# Patient Record
Sex: FEMALE | Race: BLACK OR AFRICAN AMERICAN | NOT HISPANIC OR LATINO | URBAN - METROPOLITAN AREA
[De-identification: names, ages, dates, MRNs, and addresses within clinical notes are randomized per-mention and may not be internally consistent; named-entity substitution may affect disease eponyms.]

---

## 2021-02-09 ENCOUNTER — OUTPATIENT (OUTPATIENT)
Dept: OUTPATIENT SERVICES | Facility: HOSPITAL | Age: 46
LOS: 1 days | End: 2021-02-09

## 2021-02-09 VITALS
SYSTOLIC BLOOD PRESSURE: 120 MMHG | TEMPERATURE: 98 F | HEART RATE: 77 BPM | WEIGHT: 195.11 LBS | RESPIRATION RATE: 14 BRPM | HEIGHT: 69 IN | DIASTOLIC BLOOD PRESSURE: 75 MMHG | OXYGEN SATURATION: 99 %

## 2021-02-09 DIAGNOSIS — K64.9 UNSPECIFIED HEMORRHOIDS: ICD-10-CM

## 2021-02-09 DIAGNOSIS — Z90.89 ACQUIRED ABSENCE OF OTHER ORGANS: Chronic | ICD-10-CM

## 2021-02-09 DIAGNOSIS — Z98.890 OTHER SPECIFIED POSTPROCEDURAL STATES: ICD-10-CM

## 2021-02-09 DIAGNOSIS — Z98.890 OTHER SPECIFIED POSTPROCEDURAL STATES: Chronic | ICD-10-CM

## 2021-02-09 LAB
HCG UR QL: NEGATIVE — SIGNIFICANT CHANGE UP
HCT VFR BLD CALC: 40.7 % — SIGNIFICANT CHANGE UP (ref 34.5–45)
HGB BLD-MCNC: 12.9 G/DL — SIGNIFICANT CHANGE UP (ref 11.5–15.5)
MCHC RBC-ENTMCNC: 29.1 PG — SIGNIFICANT CHANGE UP (ref 27–34)
MCHC RBC-ENTMCNC: 31.7 GM/DL — LOW (ref 32–36)
MCV RBC AUTO: 91.7 FL — SIGNIFICANT CHANGE UP (ref 80–100)
NRBC # BLD: 0 /100 WBCS — SIGNIFICANT CHANGE UP
NRBC # FLD: 0 K/UL — SIGNIFICANT CHANGE UP
PLATELET # BLD AUTO: 361 K/UL — SIGNIFICANT CHANGE UP (ref 150–400)
RBC # BLD: 4.44 M/UL — SIGNIFICANT CHANGE UP (ref 3.8–5.2)
RBC # FLD: 13.5 % — SIGNIFICANT CHANGE UP (ref 10.3–14.5)
WBC # BLD: 6.36 K/UL — SIGNIFICANT CHANGE UP (ref 3.8–10.5)
WBC # FLD AUTO: 6.36 K/UL — SIGNIFICANT CHANGE UP (ref 3.8–10.5)

## 2021-02-09 NOTE — H&P PST ADULT - NSICDXPROBLEM_GEN_ALL_CORE_FT
PROBLEM DIAGNOSES  Problem: Hemorrhoids  Assessment and Plan: Pt scheduled for bleeding hemorrhoid, hemorrhoidectomy on 2/18/2021.  labs done results pending.   Pt scheduled for covid testing.  Urine cup provided.  Preop teaching done, pt able to verbalize understanding.   meds day of surgery pepcid

## 2021-02-09 NOTE — H&P PST ADULT - RS GEN PE MLT RESP DETAILS PC
breath sounds equal/good air movement/respirations non-labored/clear to auscultation bilaterally/no chest wall tenderness/no intercostal retractions/no rales/no rhonchi/no wheezes

## 2021-02-09 NOTE — H&P PST ADULT - NSICDXPASTSURGICALHX_GEN_ALL_CORE_FT
PAST SURGICAL HISTORY:  S/P myomectomy 2017    S/P rotator cuff repair left    S/P T&A (status post tonsillectomy and adenoidectomy) 1987

## 2021-02-09 NOTE — H&P PST ADULT - HISTORY OF PRESENT ILLNESS
46y/o female scheduled for bleeding hemorrhoid, hemorrhoidectomy on 2/18/2021.  Pt states, "hx of hemorrhoids since 1993, over the past 2 years, symptoms have worsened, bleeding with bm."

## 2021-02-13 DIAGNOSIS — Z01.818 ENCOUNTER FOR OTHER PREPROCEDURAL EXAMINATION: ICD-10-CM

## 2021-02-13 PROBLEM — Z00.00 ENCOUNTER FOR PREVENTIVE HEALTH EXAMINATION: Status: ACTIVE | Noted: 2021-02-13

## 2021-02-15 ENCOUNTER — APPOINTMENT (OUTPATIENT)
Dept: DISASTER EMERGENCY | Facility: CLINIC | Age: 46
End: 2021-02-15

## 2021-02-16 LAB — SARS-COV-2 N GENE NPH QL NAA+PROBE: NOT DETECTED

## 2021-02-17 ENCOUNTER — TRANSCRIPTION ENCOUNTER (OUTPATIENT)
Age: 46
End: 2021-02-17

## 2021-02-18 ENCOUNTER — RESULT REVIEW (OUTPATIENT)
Age: 46
End: 2021-02-18

## 2021-02-18 ENCOUNTER — OUTPATIENT (OUTPATIENT)
Dept: OUTPATIENT SERVICES | Facility: HOSPITAL | Age: 46
LOS: 1 days | Discharge: HOME | End: 2021-02-18
Payer: MEDICAID

## 2021-02-18 VITALS
OXYGEN SATURATION: 99 % | HEIGHT: 69 IN | WEIGHT: 195.11 LBS | RESPIRATION RATE: 20 BRPM | SYSTOLIC BLOOD PRESSURE: 110 MMHG | DIASTOLIC BLOOD PRESSURE: 66 MMHG | TEMPERATURE: 98 F | HEART RATE: 71 BPM

## 2021-02-18 VITALS
RESPIRATION RATE: 22 BRPM | HEART RATE: 65 BPM | TEMPERATURE: 98 F | OXYGEN SATURATION: 98 % | SYSTOLIC BLOOD PRESSURE: 126 MMHG | DIASTOLIC BLOOD PRESSURE: 82 MMHG

## 2021-02-18 DIAGNOSIS — Z98.890 OTHER SPECIFIED POSTPROCEDURAL STATES: Chronic | ICD-10-CM

## 2021-02-18 DIAGNOSIS — K64.9 UNSPECIFIED HEMORRHOIDS: ICD-10-CM

## 2021-02-18 DIAGNOSIS — Z90.89 ACQUIRED ABSENCE OF OTHER ORGANS: Chronic | ICD-10-CM

## 2021-02-18 PROCEDURE — 88304 TISSUE EXAM BY PATHOLOGIST: CPT | Mod: 26

## 2021-02-18 RX ORDER — OXYCODONE HYDROCHLORIDE 5 MG/1
1 TABLET ORAL
Qty: 20 | Refills: 0
Start: 2021-02-18

## 2021-02-18 RX ORDER — DOCUSATE SODIUM 100 MG
1 CAPSULE ORAL
Qty: 60 | Refills: 0
Start: 2021-02-18

## 2021-02-18 NOTE — ASU PREOP CHECKLIST - PATIENT PROBLEMS/NEEDS
"Requested Prescriptions   Pending Prescriptions Disp Refills     omega-3 acid ethyl esters (LOVAZA) 1 G capsule 90 capsule 3     Sig: Take 1 capsule (1 g) by mouth daily    Antihyperlipidemic agents Passed    3/30/2018 11:39 AM       Passed - Lipid panel on file in past 12 mos    Recent Labs   Lab Test  06/13/17   0809   05/15/15   0833   CHOL  137   < >  183   TRIG  280*   < >  523*   HDL  36*   < >  35*   LDL  45   < >  Cannot estimate LDL when triglyceride exceeds 400 mg/dL  76   NHDL  101   < >   --    VLDL   --    --   Cannot estimate VLDL when triglyceride exceeds 400 mg/dL.   CHOLHDLRATIO   --    --   5.2*    < > = values in this interval not displayed.              Passed - Normal serum ALT on record in past 12 mos    Recent Labs   Lab Test  06/13/17   0809   ALT  37            Passed - Recent (12 mo) or future (30 days) visit within the authorizing provider's specialty    Patient had office visit in the last 12 months or has a visit in the next 30 days with authorizing provider or within the authorizing provider's specialty.  See \"Patient Info\" tab in inbasket, or \"Choose Columns\" in Meds & Orders section of the refill encounter.           Passed - Patient is age 18 years or older          "
Prescription approved per [facility] Refill Protocol  Yvette Rossi RN BS    
Requested Prescriptions   Pending Prescriptions Disp Refills     omega-3 acid ethyl esters (LOVAZA) 1 G capsule  Last Written Prescription Date:  5/11/2017  Last Fill Quantity: 90,  # refills: 3   Last office visit: 10/6/2017 with prescribing provider:  10/6/2017   Future Office Visit:   90 capsule 3     Sig: Take 1 capsule (1 g) by mouth daily    There is no refill protocol information for this order        
Patient expressed no known problems or needs

## 2021-02-18 NOTE — BRIEF OPERATIVE NOTE - NSICDXBRIEFPROCEDURE_GEN_ALL_CORE_FT
PROCEDURES:  Hemorrhoidectomy involving two or more anal columns 18-Feb-2021 12:13:40  Will Balbuena

## 2021-02-18 NOTE — ASU DISCHARGE PLAN (ADULT/PEDIATRIC) - CALL YOUR DOCTOR IF YOU HAVE ANY OF THE FOLLOWING:
Bleeding that does not stop/Swelling that gets worse/Pain not relieved by Medications/Wound/Surgical Site with redness, or foul smelling discharge or pus/Unable to urinate

## 2021-02-18 NOTE — ASU DISCHARGE PLAN (ADULT/PEDIATRIC) - ASU DC SPECIAL INSTRUCTIONSFT
PAIN CONTROL: You may take 650-1000 mg of Tylenol every 6 hours. Do not exceed 4 grams of Tylenol daily. You may take 400-600 mg ibuprofen every 6 hours. It may be helpful to alternate the two every 3 hours. Take oxycodone as needed for severe pain, one tab every 4-6 hours. Do not exceed 6 tabs daily.  WOUND CARE: Remove the tape, gauze, and packing in your rectum tomorrow morning.   BATHING: No restrictions.  SITZ BATHS: Please perform Sitz baths at least twice daily.  CONSTIPATION: Take colace twice a day as needed to keep your stools soft in order to avoid straining with bowel movements.   ACTIVITY: No heavy lifting or straining. Otherwise, you may return to your usual level of physical activity. If you are taking narcotic pain medication (such as oxycodone), do NOT drive a car, operate machinery or make important decisions.  DIET: Return to your usual diet.  NOTIFY YOUR SURGEON IF: You have any bleeding that does not stop, any pus draining from your wound, any fever (over 100.4 F) or chills, persistent nausea/vomiting, persistent diarrhea, or if your pain is not controlled on your discharge pain medications.  FOLLOW-UP:  1. Please follow up with Dr. Mcnamara within 1-2 weeks after discharge from the hospital. You may call [phone] to schedule an appointment.   2. Please follow up with your primary care physician in one week regarding your hospitalization.

## 2021-02-18 NOTE — ASU DISCHARGE PLAN (ADULT/PEDIATRIC) - CARE PROVIDER_API CALL
Shahzad Mcnamara)  Surgery  [address], [address]  [place] [address]  Phone: [phone]  Fax: [phone]  Follow Up Time:

## 2021-02-25 LAB — SURGICAL PATHOLOGY STUDY: SIGNIFICANT CHANGE UP

## 2021-08-31 NOTE — H&P PST ADULT - BACK EXAM
I personally performed the service described in the documentation recorded by the scribe in my presence, and it accurately and completely records my words and actions. normal shape/ROM intact/strength intact

## 2022-01-18 PROBLEM — K64.9 UNSPECIFIED HEMORRHOIDS: Chronic | Status: ACTIVE | Noted: 2021-02-09

## 2022-02-01 ENCOUNTER — NON-APPOINTMENT (OUTPATIENT)
Age: 47
End: 2022-02-01

## 2022-02-01 ENCOUNTER — APPOINTMENT (OUTPATIENT)
Dept: OPHTHALMOLOGY | Facility: CLINIC | Age: 47
End: 2022-02-01
Payer: MEDICAID

## 2022-02-01 PROCEDURE — 92004 COMPRE OPH EXAM NEW PT 1/>: CPT

## 2023-08-11 ENCOUNTER — OFFICE (OUTPATIENT)
Dept: URBAN - METROPOLITAN AREA CLINIC 77 | Facility: CLINIC | Age: 48
Setting detail: OPHTHALMOLOGY
End: 2023-08-11
Payer: COMMERCIAL

## 2023-08-11 DIAGNOSIS — H16.223: ICD-10-CM

## 2023-08-11 DIAGNOSIS — T26.61XA: ICD-10-CM

## 2023-08-11 PROBLEM — H16.221 DRY EYE SYNDROME K SICCA; RIGHT EYE, LEFT EYE: Status: ACTIVE | Noted: 2023-08-11

## 2023-08-11 PROBLEM — T26.62XA: Status: ACTIVE | Noted: 2023-08-11

## 2023-08-11 PROBLEM — H16.222 DRY EYE SYNDROME K SICCA; RIGHT EYE, LEFT EYE: Status: ACTIVE | Noted: 2023-08-11

## 2023-08-11 PROCEDURE — 68761 CLOSE TEAR DUCT OPENING: CPT | Performed by: OPHTHALMOLOGY

## 2023-08-11 PROCEDURE — 99204 OFFICE O/P NEW MOD 45 MIN: CPT | Performed by: OPHTHALMOLOGY

## 2023-08-11 ASSESSMENT — TONOMETRY
OS_IOP_MMHG: 11
OD_IOP_MMHG: 11

## 2023-08-11 ASSESSMENT — LID EXAM ASSESSMENTS
OS_COMMENTS: MUCOID DISCHARGE
OS_EDEMA: 1+
OD_COMMENTS: MUCOID DISCHARGE
OD_EDEMA: 1+

## 2023-08-11 ASSESSMENT — VISUAL ACUITY
OD_BCVA: 20/150
OS_BCVA: 20/60

## 2023-09-22 ASSESSMENT — CORNEAL TRAUMA - ABRASION
OD_ABRASION: PRESENT
OS_ABRASION: PRESENT

## 2023-09-22 ASSESSMENT — SUPERFICIAL PUNCTATE KERATITIS (SPK)
OD_SPK: 4+
OS_SPK: 4+

## 2024-04-22 ENCOUNTER — NON-APPOINTMENT (OUTPATIENT)
Age: 49
End: 2024-04-22

## 2024-04-24 ENCOUNTER — APPOINTMENT (OUTPATIENT)
Dept: OTOLARYNGOLOGY | Facility: CLINIC | Age: 49
End: 2024-04-24
Payer: MEDICAID

## 2024-04-24 VITALS
SYSTOLIC BLOOD PRESSURE: 106 MMHG | OXYGEN SATURATION: 95 % | DIASTOLIC BLOOD PRESSURE: 69 MMHG | BODY MASS INDEX: 28.88 KG/M2 | TEMPERATURE: 97.3 F | HEART RATE: 79 BPM | WEIGHT: 195 LBS | HEIGHT: 69 IN

## 2024-04-24 PROCEDURE — 31231 NASAL ENDOSCOPY DX: CPT

## 2024-04-24 PROCEDURE — 99204 OFFICE O/P NEW MOD 45 MIN: CPT | Mod: 25

## 2024-04-24 RX ORDER — AZELASTINE HYDROCHLORIDE 137 UG/1
SPRAY, METERED NASAL
Refills: 0 | Status: ACTIVE | COMMUNITY

## 2024-05-07 ENCOUNTER — APPOINTMENT (OUTPATIENT)
Dept: OTOLARYNGOLOGY | Facility: CLINIC | Age: 49
End: 2024-05-07

## 2024-05-08 ENCOUNTER — APPOINTMENT (OUTPATIENT)
Dept: OTOLARYNGOLOGY | Facility: CLINIC | Age: 49
End: 2024-05-08
Payer: MEDICAID

## 2024-05-08 VITALS
SYSTOLIC BLOOD PRESSURE: 107 MMHG | DIASTOLIC BLOOD PRESSURE: 70 MMHG | BODY MASS INDEX: 28.88 KG/M2 | HEART RATE: 79 BPM | OXYGEN SATURATION: 99 % | WEIGHT: 195 LBS | HEIGHT: 69 IN | TEMPERATURE: 97.1 F

## 2024-05-08 PROCEDURE — 99213 OFFICE O/P EST LOW 20 MIN: CPT

## 2024-05-08 NOTE — HISTORY OF PRESENT ILLNESS
[de-identified] : CC: nasal congestion   HISTORY OF PRESENT ILLNESS: Ms. Portillo is a pleasant 48 year old lady with nasal congestion referred by Dr. Willie Tucker, an Otolaryngologist, due to the complexity of the case patient is a WTC survivor, she was working at [employer] at the time former smoker, did not experience significant issues until after 9/11, with some SOB and nasal congestion symptoms were more manageable until the past year she has had significant nasal congestion, headaches, anosmia, and orbital discomfort she was recently on prednisone and antibiotics and they were not effective in alleviating her symptoms she has tried astelin/flonase and saline rinses CT sinus performed at [facility] shows significant sinusitis right worse than left, slight DNS, supraorbital ethmoid cells, dehiscent anterior ethmoidal artery, right suprabullar cell   Environmental Allergies: negative Immunotherapy:no Smoker: quit Asthma: no ASA sensitivity: no History of Autoimmune Disease: No History of Immune Deficiency: No   Key Elements at least 4 described: Location: bilateral Severity: severe Quality: congestion Timing: constant Duration: year Modifying Factors: none Associated signs and symptoms: see above   preop visit still very symptomatic, having significant gagging   REVIEW OF SYSTEMS: General ROS: negative for - chills, fatigue or fever Psychological ROS: negative for - anxiety or depression Ophthalmic ROS: negative for - blurry vision, decreased vision or double vision ENT ROS: negative except as noted from HPI Allergy and Immunology ROS: negative except as noted from HPI Hematological and Lymphatic ROS: negative for - bleeding problems Endocrine ROS: negative for - malaise/lethargy Respiratory ROS: negative for - stridor Cardiovascular ROS: negative for - chest pain Gastrointestinal ROS: negative for - appetite loss or nausea/vomiting Genitourinary ROS: negative for - incontinence Musculoskeletal ROS: negative for - gait disturbance Neurological ROS: negative for - behavioral changes Dermatological ROS: negative for - nail changes   Physical Exam:   GENERAL APPEARANCE: Well-developed and No Acute Distress. COMMUNICATION: Able to Communicate. Strong Voice.   HEAD AND FACE Eyes: Testing of ocular motility including primary gaze alignment normal. Inspection and Appearance: No evidence of lesions or masses Palpation: Palpation of the face reveals no sinus tenderness Salivary Glands: Symmetric without masses Facial Strength: Symmetric without evidence of facial paralysis   EAR, NOSE, MOUTH, and THROAT: Ear Canals and Tympanic Membranes, Bilateral: No evidence of inflammation or lesions. Thresholds: Clinical speech reception thresholds normal. External, Nose and Auricle: No lesions or masses. Nasal, Mucosa, Septum, and Turbinates: See endoscopy.   NECK: Evaluation: No evidence of masses or crepitus. The neck is symmetric and the trachea is in the midline position. Thyroid: No evidence of enlargement, tenderness or mass. Neck Lymph Nodes: WNL. Respiratory: Inspection of the chest including symmetry, expansion and/or assessment of respiratory effort normal. Cardiovascular: Evaluation of peripheral vascular system by observation and palpation of capillary refill, normal. Neurological/Psychiatric: Alert, Oriented, Mood, and Affect Normal.   IMAGING: see HPI   previous visit PROCEDURE: Nasal endoscopy ([id number])   SURGEON: Claus Kim MD   Prior to the procedure, I had a discussion with the patient regarding the risks, benefits, and alternatives of the procedure and a verbal consent was obtained.   After obtaining adequate decongestion of the nasal mucosa with topical Epinephrine and adequate anesthesia with topical Lidocaine nasal spray, the nasal endoscope was used to examine the nasal passages and paranasal sinuses. The endoscope was passed along the floor of the nose bilaterally to evaluate the inferior meatus, floor of the nose, inferior turbinate, and nasopharynx. The scope was then passed superiorly to evaluate the area of the sphenoethmoidal recess, superior turbinate and superior meatus. As the scope was withdrawn anteriorly, the middle turbinate and middle meatus were carefully inspected. The endoscope was withdrawn and the patient tolerated the procedure well. No complications were encountered.   INSTRUMENTS: rigid 45   EXAM FINDINGS: severe BITH and clear mucus. after decongestion with pledgets, copious clear mucus suctioned out from the nasopharynx to provide some symptomatic relief. diffuse edema and erythema. no clayton polyps or mucopurulence   IMPRESSION: Ms. Portillo is a pleasant 48 year old lady with DNS, CRSsNP, WTC survivor   PLAN: -r/b/a discussed, we will proceed with bFESS septoplasty. she understands she has complex sinus anatomy -will need periop meds  Review of surgical indications for Endoscopic Sinus Surgery:  This patient has had a history of chronic sinusitis of greater than 1 year duration including treatment time, and this treatment has included multiple courses of antibiotics and steroid nasal sprays. Despite these attempts at treatment, the patient continues to complain of nasal congestion, discolored nasal drainage, and a disturbance of smell and taste function. The patient has undergone CT scanning that has findings that are consistent with chronic sinusitis.  Therefore, based on the symptoms of chronic sinusitis,objective findings of CT scan consistent with chronic sinusitis, the time course of symptoms and failure of medical treatment, this patient meets the indications for surgical intervention and requres endoscopic sinus surgery in attempt to relieve symptoms.  The need for maintenance with daily nasal irrigations with saline/topical steroids, and antihistamine to minimize the chronic inflammation was discussed with the patient.   The risks of endoscopic sinus surgery were also discussed in detail with the patient. More specifically, the risks of CSF leak, orbital injury with temporary or permanent vision loss, nasal bleeding, epiphora, loss of smell, and the potential for persistence of their underlying problem.     Claus Kim MD [facility] Rhinology and [facility] Surgery Department of Otolaryngology- Head and Neck Surgery [facility]

## 2024-05-09 ENCOUNTER — TRANSCRIPTION ENCOUNTER (OUTPATIENT)
Age: 49
End: 2024-05-09

## 2024-05-15 RX ORDER — SULFAMETHOXAZOLE AND TRIMETHOPRIM 800; 160 MG/1; MG/1
800-160 TABLET ORAL TWICE DAILY
Qty: 28 | Refills: 0 | Status: ACTIVE | COMMUNITY
Start: 2024-05-15 | End: 1900-01-01

## 2024-05-15 RX ORDER — ELASTIC BANDAGE 1"X2.2YD
2300-700 BANDAGE TOPICAL
Qty: 1 | Refills: 0 | Status: ACTIVE | COMMUNITY
Start: 2024-05-15 | End: 1900-01-01

## 2024-05-15 RX ORDER — CLINDAMYCIN HYDROCHLORIDE 150 MG/1
150 CAPSULE ORAL
Qty: 42 | Refills: 0 | Status: ACTIVE | COMMUNITY
Start: 2024-05-15 | End: 1900-01-01

## 2024-05-23 ENCOUNTER — APPOINTMENT (OUTPATIENT)
Dept: OTOLARYNGOLOGY | Facility: CLINIC | Age: 49
End: 2024-05-23
Payer: MEDICAID

## 2024-05-23 PROCEDURE — 31259 NSL/SINS NDSC SPHN TISS RMVL: CPT | Mod: 50

## 2024-05-23 PROCEDURE — 30520 REPAIR OF NASAL SEPTUM: CPT

## 2024-05-23 PROCEDURE — 31276 NSL/SINS NDSC FRNT TISS RMVL: CPT | Mod: 50

## 2024-05-23 PROCEDURE — 30930 THER FX NASAL INF TURBINATE: CPT

## 2024-05-23 PROCEDURE — 31267 ENDOSCOPY MAXILLARY SINUS: CPT | Mod: 50

## 2024-05-23 PROCEDURE — 61782 SCAN PROC CRANIAL EXTRA: CPT

## 2024-05-28 ENCOUNTER — APPOINTMENT (OUTPATIENT)
Dept: OTOLARYNGOLOGY | Facility: CLINIC | Age: 49
End: 2024-05-28
Payer: MEDICAID

## 2024-05-28 PROCEDURE — 31237 NSL/SINS NDSC SURG BX POLYPC: CPT | Mod: 50,79

## 2024-05-28 RX ORDER — OXYCODONE AND ACETAMINOPHEN 5; 325 MG/1; MG/1
5-325 TABLET ORAL
Qty: 30 | Refills: 0 | Status: DISCONTINUED | COMMUNITY
Start: 2024-05-15 | End: 2024-05-28

## 2024-05-28 RX ORDER — HYDROMORPHONE HYDROCHLORIDE 2 MG/1
2 TABLET ORAL
Qty: 30 | Refills: 0 | Status: ACTIVE | COMMUNITY
Start: 2024-05-28 | End: 1900-01-01

## 2024-05-28 NOTE — PROCEDURE
[FreeTextEntry3] : HISTORY OF PRESENT ILLNESS Ms. Portillo is a pleasant 48 year old lady who is s/p bFESS septoplasty on 5/23/24. Currently on pred20/clinda/bactrim/NSI. Complaints: congestion, headache   Physical Exam General: AAO x 3, WDWN Ears: Canals clear, TM;s nrml Nose: See endoscopy Throat: uvula midline. No masses or lesions Eyes: PERRL, EOMI Neuro: Gross nrml, CN 2-12 intact Resp: Nrml chest excursion Skin: Appears intact   Procedure Note   PROCEDURE: Nasal/sinus endoscopy, surgical, with debridement ([id number])   INDICATION:   Prior to the procedure, I had a discussion with the patient regarding the risks, benefits, and alternatives of the debridement and they signed a consent.   SURGEON: Dr. Claus Kim   PROCEDURE DETAIL: After obtaining adequate decongestion of the nasal mucosa with ephedrine nasal spray, and adequate anesthesia with 2% topical Lidocaine nasal spray, the nasal endoscope was used to examine the nasal passages and paranasal sinuses. Using a combination of suction, grasping instruments and swabs, the maxillary, ethmoid, frontal and sphenoid sinuses were debrided bilaterally of pus, crust, debris, clots and polyps to prevent scar formation, continued sinusitis and mucocele formation. At the end of this procedure, all operated sinuses were patent. The endoscope was withdrawn, and the patient tolerated the procedure well. No complications were encountered.   INSTRUMENTS: rigid 45   ENDOSCOPIC FINDINGS: doyles and fingercots removed. partially debrided   Impression: doing well   Plan: -Complete course of antibiotics -Continue 20mg of prednisone -Continue nasal irrigations with saline -Return to clinic in 1 week.   The patient was given an opportunity to ask questions, and all questions asked were answered to the best of my ability.   Claus Kim MD

## 2024-05-30 ENCOUNTER — APPOINTMENT (OUTPATIENT)
Dept: OTOLARYNGOLOGY | Facility: CLINIC | Age: 49
End: 2024-05-30

## 2024-06-03 ENCOUNTER — APPOINTMENT (OUTPATIENT)
Dept: OTOLARYNGOLOGY | Facility: CLINIC | Age: 49
End: 2024-06-03
Payer: MEDICAID

## 2024-06-03 PROCEDURE — 31237 NSL/SINS NDSC SURG BX POLYPC: CPT | Mod: 50,79

## 2024-06-03 NOTE — PROCEDURE
[FreeTextEntry3] : HISTORY OF PRESENT ILLNESS Ms. Portillo is a pleasant 48 year old lady who is s/p bFESS septoplasty on 5/23/24. Currently on pred20/clinda/bactrim/NSI. Complaints: feeling much better   Physical Exam General: AAO x 3, WDWN Ears: Canals clear, TM;s nrml Nose: See endoscopy Throat: uvula midline. No masses or lesions Eyes: PERRL, EOMI Neuro: Gross nrml, CN 2-12 intact Resp: Nrml chest excursion Skin: Appears intact   Procedure Note   PROCEDURE: Nasal/sinus endoscopy, surgical, with debridement ([id number])   INDICATION:   Prior to the procedure, I had a discussion with the patient regarding the risks, benefits, and alternatives of the debridement and they signed a consent.   SURGEON: Dr. Claus Kim   PROCEDURE DETAIL: After obtaining adequate decongestion of the nasal mucosa with ephedrine nasal spray, and adequate anesthesia with 2% topical Lidocaine nasal spray, the nasal endoscope was used to examine the nasal passages and paranasal sinuses. Using a combination of suction, grasping instruments and swabs, the maxillary, ethmoid, frontal and sphenoid sinuses were debrided bilaterally of pus, crust, debris, clots and polyps to prevent scar formation, continued sinusitis and mucocele formation. At the end of this procedure, all operated sinuses were patent. The endoscope was withdrawn, and the patient tolerated the procedure well. No complications were encountered.   INSTRUMENTS: rigid 45   ENDOSCOPIC FINDINGS: much improved debris removed, thick mucus from frontal removed. airway widely patent.   Impression: doing well   Plan: -Complete course of antibiotics -Continue 10mg of prednisone for a week, 5mg for a week then off -Continue nasal irrigations with saline -Return to clinic in 2 weeks.   The patient was given an opportunity to ask questions, and all questions asked were answered to the best of my ability.   Claus Kim MD

## 2024-06-19 ENCOUNTER — APPOINTMENT (OUTPATIENT)
Dept: OTOLARYNGOLOGY | Facility: CLINIC | Age: 49
End: 2024-06-19
Payer: MEDICAID

## 2024-06-19 VITALS
OXYGEN SATURATION: 99 % | HEART RATE: 76 BPM | BODY MASS INDEX: 28.88 KG/M2 | DIASTOLIC BLOOD PRESSURE: 79 MMHG | WEIGHT: 195 LBS | TEMPERATURE: 97.9 F | HEIGHT: 69 IN | SYSTOLIC BLOOD PRESSURE: 116 MMHG

## 2024-06-19 PROCEDURE — 31237 NSL/SINS NDSC SURG BX POLYPC: CPT | Mod: 50,79

## 2024-06-19 RX ORDER — BUDESONIDE 0.5 MG/2ML
0.5 INHALANT ORAL
Qty: 180 | Refills: 3 | Status: ACTIVE | COMMUNITY
Start: 2024-06-19 | End: 1900-01-01

## 2024-06-19 NOTE — PROCEDURE
[FreeTextEntry3] : HISTORY OF PRESENT ILLNESS Ms. Portillo is a pleasant 48 year old lady who is s/p bFESS septoplasty on 5/23/24. Currently on pred5/NSI. Complaints: feeling much better but still have some PND and right frontal pain   Physical Exam General: AAO x 3, WDWN Ears: Canals clear, TM;s nrml Nose: See endoscopy Throat: uvula midline. No masses or lesions Eyes: PERRL, EOMI Neuro: Gross nrml, CN 2-12 intact Resp: Nrml chest excursion Skin: Appears intact   Procedure Note   PROCEDURE: Nasal/sinus endoscopy, surgical, with debridement ([id number])   INDICATION:   Prior to the procedure, I had a discussion with the patient regarding the risks, benefits, and alternatives of the debridement and they signed a consent.   SURGEON: Dr. Claus Kim   PROCEDURE DETAIL: After obtaining adequate decongestion of the nasal mucosa with ephedrine nasal spray, and adequate anesthesia with 2% topical Lidocaine nasal spray, the nasal endoscope was used to examine the nasal passages and paranasal sinuses. Using a combination of suction, grasping instruments and swabs, the maxillary, ethmoid, frontal and sphenoid sinuses were debrided bilaterally of pus, crust, debris, clots and polyps to prevent scar formation, continued sinusitis and mucocele formation. At the end of this procedure, all operated sinuses were patent. The endoscope was withdrawn, and the patient tolerated the procedure well. No complications were encountered.   INSTRUMENTS: rigid 45   ENDOSCOPIC FINDINGS: polypoid edema in the frontals right worse than left but frontals are widely patent. right worse than left debris removed. airway widely patent.   Impression: doing well   Plan: -taper off pred -Continue nasal irrigations with budesonide -Return to clinic in 4 weeks.   The patient was given an opportunity to ask questions, and all questions asked were answered to the best of my ability.   Claus Kim MD

## 2024-06-26 ENCOUNTER — APPOINTMENT (OUTPATIENT)
Dept: OTOLARYNGOLOGY | Facility: CLINIC | Age: 49
End: 2024-06-26

## 2024-06-26 VITALS
TEMPERATURE: 97.7 F | DIASTOLIC BLOOD PRESSURE: 77 MMHG | BODY MASS INDEX: 28.88 KG/M2 | HEART RATE: 79 BPM | HEIGHT: 69 IN | OXYGEN SATURATION: 98 % | SYSTOLIC BLOOD PRESSURE: 117 MMHG | WEIGHT: 195 LBS

## 2024-06-26 DIAGNOSIS — J32.1 CHRONIC FRONTAL SINUSITIS: ICD-10-CM

## 2024-06-26 DIAGNOSIS — J32.2 CHRONIC ETHMOIDAL SINUSITIS: ICD-10-CM

## 2024-06-26 DIAGNOSIS — J34.2 DEVIATED NASAL SEPTUM: ICD-10-CM

## 2024-06-26 DIAGNOSIS — J32.0 CHRONIC MAXILLARY SINUSITIS: ICD-10-CM

## 2024-06-26 DIAGNOSIS — J32.3 CHRONIC SPHENOIDAL SINUSITIS: ICD-10-CM

## 2024-06-26 PROCEDURE — 31231 NASAL ENDOSCOPY DX: CPT | Mod: 58

## 2024-06-26 PROCEDURE — 99214 OFFICE O/P EST MOD 30 MIN: CPT | Mod: 25

## 2024-06-27 ENCOUNTER — RX RENEWAL (OUTPATIENT)
Age: 49
End: 2024-06-27

## 2024-07-17 ENCOUNTER — APPOINTMENT (OUTPATIENT)
Dept: OTOLARYNGOLOGY | Facility: CLINIC | Age: 49
End: 2024-07-17
Payer: MEDICAID

## 2024-07-17 VITALS
HEIGHT: 69 IN | TEMPERATURE: 98.2 F | DIASTOLIC BLOOD PRESSURE: 74 MMHG | HEART RATE: 73 BPM | SYSTOLIC BLOOD PRESSURE: 110 MMHG | WEIGHT: 195 LBS | OXYGEN SATURATION: 100 % | BODY MASS INDEX: 28.88 KG/M2

## 2024-07-17 DIAGNOSIS — J34.2 DEVIATED NASAL SEPTUM: ICD-10-CM

## 2024-07-17 DIAGNOSIS — J32.2 CHRONIC ETHMOIDAL SINUSITIS: ICD-10-CM

## 2024-07-17 DIAGNOSIS — J32.1 CHRONIC FRONTAL SINUSITIS: ICD-10-CM

## 2024-07-17 DIAGNOSIS — J32.3 CHRONIC SPHENOIDAL SINUSITIS: ICD-10-CM

## 2024-07-17 DIAGNOSIS — J32.0 CHRONIC MAXILLARY SINUSITIS: ICD-10-CM

## 2024-07-17 PROCEDURE — 31231 NASAL ENDOSCOPY DX: CPT | Mod: 58

## 2024-07-17 PROCEDURE — 99024 POSTOP FOLLOW-UP VISIT: CPT | Mod: 25

## 2024-07-17 PROCEDURE — 99214 OFFICE O/P EST MOD 30 MIN: CPT | Mod: 25

## 2024-07-17 RX ORDER — LEVOFLOXACIN 500 MG/1
500 TABLET, FILM COATED ORAL DAILY
Qty: 10 | Refills: 0 | Status: ACTIVE | COMMUNITY
Start: 2024-07-17 | End: 1900-01-01

## 2024-07-29 RX ORDER — BUDESONIDE 1 MG/2ML
1 INHALANT ORAL
Qty: 180 | Refills: 3 | Status: ACTIVE | COMMUNITY
Start: 2024-07-29 | End: 1900-01-01

## 2024-08-08 ENCOUNTER — APPOINTMENT (OUTPATIENT)
Dept: OTOLARYNGOLOGY | Facility: CLINIC | Age: 49
End: 2024-08-08

## 2024-08-08 PROCEDURE — 99024 POSTOP FOLLOW-UP VISIT: CPT

## 2024-08-08 PROCEDURE — 99214 OFFICE O/P EST MOD 30 MIN: CPT | Mod: 25

## 2024-08-08 NOTE — HISTORY OF PRESENT ILLNESS
[de-identified] : CC: nasal congestion   HISTORY OF PRESENT ILLNESS: Ms. Portillo is a pleasant 48 year old lady with nasal congestion referred by Dr. Willie Tucker, an Otolaryngologist, due to the complexity of the case patient is a WTC survivor, she was working at [employer] at the time former smoker, did not experience significant issues until after 9/11, with some SOB and nasal congestion symptoms were more manageable until the past year she has had significant nasal congestion, headaches, anosmia, and orbital discomfort she was recently on prednisone and antibiotics and they were not effective in alleviating her symptoms she has tried astelin/flonase and saline rinses CT sinus performed at [facility] shows significant sinusitis right worse than left, slight DNS, supraorbital ethmoid cells, dehiscent anterior ethmoidal artery, right suprabullar cell   Environmental Allergies: negative Immunotherapy:no Smoker: quit Asthma: no ASA sensitivity: no History of Autoimmune Disease: No History of Immune Deficiency: No   Key Elements at least 4 described: Location: bilateral Severity: severe Quality: congestion Timing: constant Duration: year Modifying Factors: none Associated signs and symptoms: see above   preop visit still very symptomatic, having significant gagging   1 month s/p bFESS septoplasty on 5/23/24 on budesonide BID left frontal headache  3 week follow up s/p pred40 taper, budesonide she did not titrate to 1mg BID, but she did take 2 courses of the pred40 taper much better also taking aleve for right frontal headaches  3 week unscheduled follow up went to [place] and has had significant bilateral but left worse than right ear fullness, decreased hearing suspect significant sinonasal infection she did irrigate while she was on vacation  REVIEW OF SYSTEMS: General ROS: negative for - chills, fatigue or fever Psychological ROS: negative for - anxiety or depression Ophthalmic ROS: negative for - blurry vision, decreased vision or double vision ENT ROS: negative except as noted from HPI Allergy and Immunology ROS: negative except as noted from HPI Hematological and Lymphatic ROS: negative for - bleeding problems Endocrine ROS: negative for - malaise/lethargy Respiratory ROS: negative for - stridor Cardiovascular ROS: negative for - chest pain Gastrointestinal ROS: negative for - appetite loss or nausea/vomiting Genitourinary ROS: negative for - incontinence Musculoskeletal ROS: negative for - gait disturbance Neurological ROS: negative for - behavioral changes Dermatological ROS: negative for - nail changes   Physical Exam:   GENERAL APPEARANCE: Well-developed and No Acute Distress. COMMUNICATION: Able to Communicate. Strong Voice.   HEAD AND FACE Eyes: Testing of ocular motility including primary gaze alignment normal. Inspection and Appearance: No evidence of lesions or masses Palpation: Palpation of the face reveals no sinus tenderness Salivary Glands: Symmetric without masses Facial Strength: Symmetric without evidence of facial paralysis   EAR, NOSE, MOUTH, and THROAT: Ear Canals and Tympanic Membranes, Bilateral: No evidence of inflammation or lesions. Thresholds: Clinical speech reception thresholds normal. External, Nose and Auricle: No lesions or masses. Nasal, Mucosa, Septum, and Turbinates: See endoscopy.   NECK: Evaluation: No evidence of masses or crepitus. The neck is symmetric and the trachea is in the midline position. Thyroid: No evidence of enlargement, tenderness or mass. Neck Lymph Nodes: WNL. Respiratory: Inspection of the chest including symmetry, expansion and/or assessment of respiratory effort normal. Cardiovascular: Evaluation of peripheral vascular system by observation and palpation of capillary refill, normal. Neurological/Psychiatric: Alert, Oriented, Mood, and Affect Normal.   IMAGING: see HPI  PROCEDURE: Nasal endoscopy ([id number])   SURGEON: Claus Kim MD   Prior to the procedure, I had a discussion with the patient regarding the risks, benefits, and alternatives of the procedure and a verbal consent was obtained.   After obtaining adequate decongestion of the nasal mucosa with topical Epinephrine and adequate anesthesia with topical Lidocaine nasal spray, the nasal endoscope was used to examine the nasal passages and paranasal sinuses. The endoscope was passed along the floor of the nose bilaterally to evaluate the inferior meatus, floor of the nose, inferior turbinate, and nasopharynx. The scope was then passed superiorly to evaluate the area of the sphenoethmoidal recess, superior turbinate and superior meatus. As the scope was withdrawn anteriorly, the middle turbinate and middle meatus were carefully inspected. The endoscope was withdrawn and the patient tolerated the procedure well. No complications were encountered.   INSTRUMENTS: rigid 45   EXAM FINDINGS: left side with significant polypoid edema, thick mucopurulence removed from the left max. right side has limited polypoid edema in the frontal recess, decompressed partially, some improvement   IMPRESSION: Ms. Portillo is a pleasant 48 year old lady with DNS, CRSsNP, WTC survivor s/p bFESS septoplasty on 5/23/24   PLAN: -budesonide 1mg BID -levaquin -RTC 6-12 weeks  Claus Kim MD [facility] Rhinology and Skull Base Surgery Department of Otolaryngology- Head and Neck Surgery [facility]

## 2024-08-28 ENCOUNTER — APPOINTMENT (OUTPATIENT)
Dept: OTOLARYNGOLOGY | Facility: CLINIC | Age: 49
End: 2024-08-28

## 2024-10-02 ENCOUNTER — APPOINTMENT (OUTPATIENT)
Dept: OTOLARYNGOLOGY | Facility: CLINIC | Age: 49
End: 2024-10-02
Payer: MEDICAID

## 2024-10-02 VITALS
HEART RATE: 88 BPM | OXYGEN SATURATION: 98 % | WEIGHT: 195 LBS | TEMPERATURE: 97.3 F | SYSTOLIC BLOOD PRESSURE: 124 MMHG | BODY MASS INDEX: 28.88 KG/M2 | DIASTOLIC BLOOD PRESSURE: 81 MMHG | HEIGHT: 69 IN

## 2024-10-02 DIAGNOSIS — J32.3 CHRONIC SPHENOIDAL SINUSITIS: ICD-10-CM

## 2024-10-02 DIAGNOSIS — J32.0 CHRONIC MAXILLARY SINUSITIS: ICD-10-CM

## 2024-10-02 DIAGNOSIS — J32.1 CHRONIC FRONTAL SINUSITIS: ICD-10-CM

## 2024-10-02 DIAGNOSIS — J32.2 CHRONIC ETHMOIDAL SINUSITIS: ICD-10-CM

## 2024-10-02 DIAGNOSIS — K21.9 GASTRO-ESOPHAGEAL REFLUX DISEASE W/OUT ESOPHAGITIS: ICD-10-CM

## 2024-10-02 DIAGNOSIS — J34.2 DEVIATED NASAL SEPTUM: ICD-10-CM

## 2024-10-02 PROCEDURE — 99214 OFFICE O/P EST MOD 30 MIN: CPT | Mod: 25

## 2024-10-02 PROCEDURE — 31231 NASAL ENDOSCOPY DX: CPT

## 2024-10-02 RX ORDER — BUDESONIDE 1 MG/2ML
1 INHALANT ORAL
Qty: 180 | Refills: 3 | Status: ACTIVE | COMMUNITY
Start: 2024-10-02 | End: 1900-01-01

## 2024-10-02 RX ORDER — FAMOTIDINE 20 MG/1
20 TABLET, FILM COATED ORAL
Qty: 30 | Refills: 1 | Status: ACTIVE | COMMUNITY
Start: 2024-10-02 | End: 1900-01-01

## 2024-10-02 NOTE — HISTORY OF PRESENT ILLNESS
[de-identified] : CC: nasal congestion   HISTORY OF PRESENT ILLNESS: Ms. Portillo is a pleasant 48 year old lady with nasal congestion referred by Dr. Willie Tucker, an Otolaryngologist, due to the complexity of the case patient is a WTC survivor, she was working at [employer] at the time former smoker, did not experience significant issues until after 9/11, with some SOB and nasal congestion symptoms were more manageable until the past year she has had significant nasal congestion, headaches, anosmia, and orbital discomfort she was recently on prednisone and antibiotics and they were not effective in alleviating her symptoms she has tried astelin/flonase and saline rinses CT sinus performed at [facility] shows significant sinusitis right worse than left, slight DNS, supraorbital ethmoid cells, dehiscent anterior ethmoidal artery, right suprabullar cell   Environmental Allergies: negative Immunotherapy:no Smoker: quit Asthma: no ASA sensitivity: no History of Autoimmune Disease: No History of Immune Deficiency: No   Key Elements at least 4 described: Location: bilateral Severity: severe Quality: congestion Timing: constant Duration: year Modifying Factors: none Associated signs and symptoms: see above   preop visit still very symptomatic, having significant gagging   1 month s/p bFESS septoplasty on 5/23/24 on budesonide BID left frontal headache  3 week follow up s/p pred40 taper, budesonide she did not titrate to 1mg BID, but she did take 2 courses of the pred40 taper much better also taking aleve for right frontal headaches  3 week unscheduled follow up went to [place] and has had significant bilateral but left worse than right ear fullness, decreased hearing suspect significant sinonasal infection she did irrigate while she was on vacation  2 mo f/u she is irrigating with budesonide 1mg BID also reports some throat irritation  REVIEW OF SYSTEMS: General ROS: negative for - chills, fatigue or fever Psychological ROS: negative for - anxiety or depression Ophthalmic ROS: negative for - blurry vision, decreased vision or double vision ENT ROS: negative except as noted from HPI Allergy and Immunology ROS: negative except as noted from HPI Hematological and Lymphatic ROS: negative for - bleeding problems Endocrine ROS: negative for - malaise/lethargy Respiratory ROS: negative for - stridor Cardiovascular ROS: negative for - chest pain Gastrointestinal ROS: negative for - appetite loss or nausea/vomiting Genitourinary ROS: negative for - incontinence Musculoskeletal ROS: negative for - gait disturbance Neurological ROS: negative for - behavioral changes Dermatological ROS: negative for - nail changes   Physical Exam:   GENERAL APPEARANCE: Well-developed and No Acute Distress. COMMUNICATION: Able to Communicate. Strong Voice.   HEAD AND FACE Eyes: Testing of ocular motility including primary gaze alignment normal. Inspection and Appearance: No evidence of lesions or masses Palpation: Palpation of the face reveals no sinus tenderness Salivary Glands: Symmetric without masses Facial Strength: Symmetric without evidence of facial paralysis   EAR, NOSE, MOUTH, and THROAT: Ear Canals and Tympanic Membranes, Bilateral: No evidence of inflammation or lesions. Thresholds: Clinical speech reception thresholds normal. External, Nose and Auricle: No lesions or masses. Nasal, Mucosa, Septum, and Turbinates: See endoscopy.   NECK: Evaluation: No evidence of masses or crepitus. The neck is symmetric and the trachea is in the midline position. Thyroid: No evidence of enlargement, tenderness or mass. Neck Lymph Nodes: WNL. Respiratory: Inspection of the chest including symmetry, expansion and/or assessment of respiratory effort normal. Cardiovascular: Evaluation of peripheral vascular system by observation and palpation of capillary refill, normal. Neurological/Psychiatric: Alert, Oriented, Mood, and Affect Normal.   IMAGING: see HPI  PROCEDURE: Nasal endoscopy (88992)   SURGEON: Claus Kim MD   Prior to the procedure, I had a discussion with the patient regarding the risks, benefits, and alternatives of the procedure and a verbal consent was obtained.   After obtaining adequate decongestion of the nasal mucosa with topical Epinephrine and adequate anesthesia with topical Lidocaine nasal spray, the nasal endoscope was used to examine the nasal passages and paranasal sinuses. The endoscope was passed along the floor of the nose bilaterally to evaluate the inferior meatus, floor of the nose, inferior turbinate, and nasopharynx. The scope was then passed superiorly to evaluate the area of the sphenoethmoidal recess, superior turbinate and superior meatus. As the scope was withdrawn anteriorly, the middle turbinate and middle meatus were carefully inspected. The endoscope was withdrawn and the patient tolerated the procedure well. No complications were encountered.   INSTRUMENTS: rigid 45   EXAM FINDINGS: left side all lower sinuses pristine, some polypoid edema in the frontal but able to see and pass suction. no thick mucus. right side with polypoid edema in the frontal recess but able to pass suction, no polyps or mucopurulence today  IMPRESSION: Ms. Portillo is a pleasant 48 year old lady with DNS, CRSsNP, WTC survivor s/p bFESS septoplasty on 5/23/24   PLAN: -budesonide 1mg BID - pepcid daily, prescribed -RTC in 3 mo  Claus Kim MD [facility] Rhinology and Skull Base Surgery Department of Otolaryngology- Head and Neck Surgery [facility]

## 2024-10-02 NOTE — HISTORY OF PRESENT ILLNESS
[de-identified] : CC: nasal congestion   HISTORY OF PRESENT ILLNESS: Ms. Portillo is a pleasant 48 year old lady with nasal congestion referred by Dr. Willie Tucker, an Otolaryngologist, due to the complexity of the case patient is a WTC survivor, she was working at [employer] at the time former smoker, did not experience significant issues until after 9/11, with some SOB and nasal congestion symptoms were more manageable until the past year she has had significant nasal congestion, headaches, anosmia, and orbital discomfort she was recently on prednisone and antibiotics and they were not effective in alleviating her symptoms she has tried astelin/flonase and saline rinses CT sinus performed at [facility] shows significant sinusitis right worse than left, slight DNS, supraorbital ethmoid cells, dehiscent anterior ethmoidal artery, right suprabullar cell   Environmental Allergies: negative Immunotherapy:no Smoker: quit Asthma: no ASA sensitivity: no History of Autoimmune Disease: No History of Immune Deficiency: No   Key Elements at least 4 described: Location: bilateral Severity: severe Quality: congestion Timing: constant Duration: year Modifying Factors: none Associated signs and symptoms: see above   preop visit still very symptomatic, having significant gagging   1 month s/p bFESS septoplasty on 5/23/24 on budesonide BID left frontal headache  3 week follow up s/p pred40 taper, budesonide she did not titrate to 1mg BID, but she did take 2 courses of the pred40 taper much better also taking aleve for right frontal headaches  3 week unscheduled follow up went to [place] and has had significant bilateral but left worse than right ear fullness, decreased hearing suspect significant sinonasal infection she did irrigate while she was on vacation  2 mo f/u she is irrigating with budesonide 1mg BID also reports some throat irritation  REVIEW OF SYSTEMS: General ROS: negative for - chills, fatigue or fever Psychological ROS: negative for - anxiety or depression Ophthalmic ROS: negative for - blurry vision, decreased vision or double vision ENT ROS: negative except as noted from HPI Allergy and Immunology ROS: negative except as noted from HPI Hematological and Lymphatic ROS: negative for - bleeding problems Endocrine ROS: negative for - malaise/lethargy Respiratory ROS: negative for - stridor Cardiovascular ROS: negative for - chest pain Gastrointestinal ROS: negative for - appetite loss or nausea/vomiting Genitourinary ROS: negative for - incontinence Musculoskeletal ROS: negative for - gait disturbance Neurological ROS: negative for - behavioral changes Dermatological ROS: negative for - nail changes   Physical Exam:   GENERAL APPEARANCE: Well-developed and No Acute Distress. COMMUNICATION: Able to Communicate. Strong Voice.   HEAD AND FACE Eyes: Testing of ocular motility including primary gaze alignment normal. Inspection and Appearance: No evidence of lesions or masses Palpation: Palpation of the face reveals no sinus tenderness Salivary Glands: Symmetric without masses Facial Strength: Symmetric without evidence of facial paralysis   EAR, NOSE, MOUTH, and THROAT: Ear Canals and Tympanic Membranes, Bilateral: No evidence of inflammation or lesions. Thresholds: Clinical speech reception thresholds normal. External, Nose and Auricle: No lesions or masses. Nasal, Mucosa, Septum, and Turbinates: See endoscopy.   NECK: Evaluation: No evidence of masses or crepitus. The neck is symmetric and the trachea is in the midline position. Thyroid: No evidence of enlargement, tenderness or mass. Neck Lymph Nodes: WNL. Respiratory: Inspection of the chest including symmetry, expansion and/or assessment of respiratory effort normal. Cardiovascular: Evaluation of peripheral vascular system by observation and palpation of capillary refill, normal. Neurological/Psychiatric: Alert, Oriented, Mood, and Affect Normal.   IMAGING: see HPI  PROCEDURE: Nasal endoscopy (58105)   SURGEON: Claus Kim MD   Prior to the procedure, I had a discussion with the patient regarding the risks, benefits, and alternatives of the procedure and a verbal consent was obtained.   After obtaining adequate decongestion of the nasal mucosa with topical Epinephrine and adequate anesthesia with topical Lidocaine nasal spray, the nasal endoscope was used to examine the nasal passages and paranasal sinuses. The endoscope was passed along the floor of the nose bilaterally to evaluate the inferior meatus, floor of the nose, inferior turbinate, and nasopharynx. The scope was then passed superiorly to evaluate the area of the sphenoethmoidal recess, superior turbinate and superior meatus. As the scope was withdrawn anteriorly, the middle turbinate and middle meatus were carefully inspected. The endoscope was withdrawn and the patient tolerated the procedure well. No complications were encountered.   INSTRUMENTS: rigid 45   EXAM FINDINGS: left side all lower sinuses pristine, some polypoid edema in the frontal but able to see and pass suction. no thick mucus. right side with polypoid edema in the frontal recess but able to pass suction, no polyps or mucopurulence today  IMPRESSION: Ms. Portillo is a pleasant 48 year old lady with DNS, CRSsNP, WTC survivor s/p bFESS septoplasty on 5/23/24   PLAN: -budesonide 1mg BID - pepcid daily, prescribed -RTC in 3 mo  Claus Kim MD [facility] Rhinology and Skull Base Surgery Department of Otolaryngology- Head and Neck Surgery [facility]

## 2024-10-02 NOTE — HISTORY OF PRESENT ILLNESS
[de-identified] : CC: nasal congestion   HISTORY OF PRESENT ILLNESS: Ms. Portillo is a pleasant 48 year old lady with nasal congestion referred by Dr. Willie Tucker, an Otolaryngologist, due to the complexity of the case patient is a WTC survivor, she was working at [employer] at the time former smoker, did not experience significant issues until after 9/11, with some SOB and nasal congestion symptoms were more manageable until the past year she has had significant nasal congestion, headaches, anosmia, and orbital discomfort she was recently on prednisone and antibiotics and they were not effective in alleviating her symptoms she has tried astelin/flonase and saline rinses CT sinus performed at [facility] shows significant sinusitis right worse than left, slight DNS, supraorbital ethmoid cells, dehiscent anterior ethmoidal artery, right suprabullar cell   Environmental Allergies: negative Immunotherapy:no Smoker: quit Asthma: no ASA sensitivity: no History of Autoimmune Disease: No History of Immune Deficiency: No   Key Elements at least 4 described: Location: bilateral Severity: severe Quality: congestion Timing: constant Duration: year Modifying Factors: none Associated signs and symptoms: see above   preop visit still very symptomatic, having significant gagging   1 month s/p bFESS septoplasty on 5/23/24 on budesonide BID left frontal headache  3 week follow up s/p pred40 taper, budesonide she did not titrate to 1mg BID, but she did take 2 courses of the pred40 taper much better also taking aleve for right frontal headaches  3 week unscheduled follow up went to [place] and has had significant bilateral but left worse than right ear fullness, decreased hearing suspect significant sinonasal infection she did irrigate while she was on vacation  2 mo f/u she is irrigating with budesonide 1mg BID also reports some throat irritation  REVIEW OF SYSTEMS: General ROS: negative for - chills, fatigue or fever Psychological ROS: negative for - anxiety or depression Ophthalmic ROS: negative for - blurry vision, decreased vision or double vision ENT ROS: negative except as noted from HPI Allergy and Immunology ROS: negative except as noted from HPI Hematological and Lymphatic ROS: negative for - bleeding problems Endocrine ROS: negative for - malaise/lethargy Respiratory ROS: negative for - stridor Cardiovascular ROS: negative for - chest pain Gastrointestinal ROS: negative for - appetite loss or nausea/vomiting Genitourinary ROS: negative for - incontinence Musculoskeletal ROS: negative for - gait disturbance Neurological ROS: negative for - behavioral changes Dermatological ROS: negative for - nail changes   Physical Exam:   GENERAL APPEARANCE: Well-developed and No Acute Distress. COMMUNICATION: Able to Communicate. Strong Voice.   HEAD AND FACE Eyes: Testing of ocular motility including primary gaze alignment normal. Inspection and Appearance: No evidence of lesions or masses Palpation: Palpation of the face reveals no sinus tenderness Salivary Glands: Symmetric without masses Facial Strength: Symmetric without evidence of facial paralysis   EAR, NOSE, MOUTH, and THROAT: Ear Canals and Tympanic Membranes, Bilateral: No evidence of inflammation or lesions. Thresholds: Clinical speech reception thresholds normal. External, Nose and Auricle: No lesions or masses. Nasal, Mucosa, Septum, and Turbinates: See endoscopy.   NECK: Evaluation: No evidence of masses or crepitus. The neck is symmetric and the trachea is in the midline position. Thyroid: No evidence of enlargement, tenderness or mass. Neck Lymph Nodes: WNL. Respiratory: Inspection of the chest including symmetry, expansion and/or assessment of respiratory effort normal. Cardiovascular: Evaluation of peripheral vascular system by observation and palpation of capillary refill, normal. Neurological/Psychiatric: Alert, Oriented, Mood, and Affect Normal.   IMAGING: see HPI  PROCEDURE: Nasal endoscopy (88373)   SURGEON: Claus Kim MD   Prior to the procedure, I had a discussion with the patient regarding the risks, benefits, and alternatives of the procedure and a verbal consent was obtained.   After obtaining adequate decongestion of the nasal mucosa with topical Epinephrine and adequate anesthesia with topical Lidocaine nasal spray, the nasal endoscope was used to examine the nasal passages and paranasal sinuses. The endoscope was passed along the floor of the nose bilaterally to evaluate the inferior meatus, floor of the nose, inferior turbinate, and nasopharynx. The scope was then passed superiorly to evaluate the area of the sphenoethmoidal recess, superior turbinate and superior meatus. As the scope was withdrawn anteriorly, the middle turbinate and middle meatus were carefully inspected. The endoscope was withdrawn and the patient tolerated the procedure well. No complications were encountered.   INSTRUMENTS: rigid 45   EXAM FINDINGS: left side all lower sinuses pristine, some polypoid edema in the frontal but able to see and pass suction. no thick mucus. right side with polypoid edema in the frontal recess but able to pass suction, no polyps or mucopurulence today  IMPRESSION: Ms. Portillo is a pleasant 48 year old lady with DNS, CRSsNP, WTC survivor s/p bFESS septoplasty on 5/23/24   PLAN: -budesonide 1mg BID - pepcid daily, prescribed -RTC in 3 mo  Claus Kim MD [facility] Rhinology and Skull Base Surgery Department of Otolaryngology- Head and Neck Surgery [facility]

## 2025-01-08 ENCOUNTER — APPOINTMENT (OUTPATIENT)
Dept: OTOLARYNGOLOGY | Facility: CLINIC | Age: 50
End: 2025-01-08
Payer: MEDICAID

## 2025-01-08 VITALS
OXYGEN SATURATION: 99 % | TEMPERATURE: 97.9 F | DIASTOLIC BLOOD PRESSURE: 72 MMHG | SYSTOLIC BLOOD PRESSURE: 117 MMHG | HEART RATE: 84 BPM | HEIGHT: 69 IN | BODY MASS INDEX: 27.4 KG/M2 | WEIGHT: 185 LBS

## 2025-01-08 DIAGNOSIS — J32.0 CHRONIC MAXILLARY SINUSITIS: ICD-10-CM

## 2025-01-08 DIAGNOSIS — J34.2 DEVIATED NASAL SEPTUM: ICD-10-CM

## 2025-01-08 DIAGNOSIS — K21.9 GASTRO-ESOPHAGEAL REFLUX DISEASE W/OUT ESOPHAGITIS: ICD-10-CM

## 2025-01-08 DIAGNOSIS — J32.3 CHRONIC SPHENOIDAL SINUSITIS: ICD-10-CM

## 2025-01-08 DIAGNOSIS — J32.2 CHRONIC ETHMOIDAL SINUSITIS: ICD-10-CM

## 2025-01-08 DIAGNOSIS — J32.1 CHRONIC FRONTAL SINUSITIS: ICD-10-CM

## 2025-01-08 PROCEDURE — 99214 OFFICE O/P EST MOD 30 MIN: CPT | Mod: 25

## 2025-01-08 PROCEDURE — 31231 NASAL ENDOSCOPY DX: CPT

## 2025-01-28 ENCOUNTER — APPOINTMENT (OUTPATIENT)
Dept: COLORECTAL SURGERY | Facility: CLINIC | Age: 50
End: 2025-01-28
Payer: MEDICAID

## 2025-01-28 VITALS
BODY MASS INDEX: 27.4 KG/M2 | OXYGEN SATURATION: 98 % | SYSTOLIC BLOOD PRESSURE: 118 MMHG | HEART RATE: 80 BPM | DIASTOLIC BLOOD PRESSURE: 74 MMHG | WEIGHT: 185 LBS | HEIGHT: 69 IN | RESPIRATION RATE: 16 BRPM

## 2025-01-28 DIAGNOSIS — Z80.0 FAMILY HISTORY OF MALIGNANT NEOPLASM OF DIGESTIVE ORGANS: ICD-10-CM

## 2025-01-28 DIAGNOSIS — Z82.49 FAMILY HISTORY OF ISCHEMIC HEART DISEASE AND OTHER DISEASES OF THE CIRCULATORY SYSTEM: ICD-10-CM

## 2025-01-28 DIAGNOSIS — K64.9 UNSPECIFIED HEMORRHOIDS: ICD-10-CM

## 2025-01-28 DIAGNOSIS — Z80.7 FAMILY HISTORY OF OTHER MALIGNANT NEOPLASMS OF LYMPHOID, HEMATOPOIETIC AND RELATED TISSUES: ICD-10-CM

## 2025-01-28 DIAGNOSIS — F17.200 NICOTINE DEPENDENCE, UNSPECIFIED, UNCOMPLICATED: ICD-10-CM

## 2025-01-28 PROCEDURE — 99203 OFFICE O/P NEW LOW 30 MIN: CPT | Mod: 25

## 2025-01-28 PROCEDURE — 46221 LIGATION OF HEMORRHOID(S): CPT

## 2025-02-11 ENCOUNTER — APPOINTMENT (OUTPATIENT)
Dept: COLORECTAL SURGERY | Facility: CLINIC | Age: 50
End: 2025-02-11
Payer: MEDICAID

## 2025-02-11 DIAGNOSIS — K64.9 UNSPECIFIED HEMORRHOIDS: ICD-10-CM

## 2025-02-11 PROCEDURE — 46221 LIGATION OF HEMORRHOID(S): CPT

## 2025-02-25 ENCOUNTER — APPOINTMENT (OUTPATIENT)
Dept: COLORECTAL SURGERY | Facility: CLINIC | Age: 50
End: 2025-02-25
Payer: MEDICAID

## 2025-02-25 DIAGNOSIS — K64.9 UNSPECIFIED HEMORRHOIDS: ICD-10-CM

## 2025-02-25 PROCEDURE — 46221 LIGATION OF HEMORRHOID(S): CPT

## 2025-04-01 ENCOUNTER — APPOINTMENT (OUTPATIENT)
Dept: COLORECTAL SURGERY | Facility: CLINIC | Age: 50
End: 2025-04-01
Payer: MEDICAID

## 2025-04-01 DIAGNOSIS — K64.9 UNSPECIFIED HEMORRHOIDS: ICD-10-CM

## 2025-04-01 PROCEDURE — 99213 OFFICE O/P EST LOW 20 MIN: CPT

## 2025-04-11 NOTE — ASU PREOPERATIVE ASSESSMENT, ADULT ([facility] ONLY) - TRANSPORT TO OR VIA
Assumed patient at 1930. Alert and oriented x 4 on room air - CPAP at night. Lung sounds clear bilaterally. Normal sinus on tele. Groin site dry and intact with old drainage, no hematoma. Continent of bowel and bladder. Up standby assist. Patient updated on plan of care and verbalized understanding.     POC: PCI recovery     Problem: CARDIOVASCULAR - ADULT  Goal: Maintains optimal cardiac output and hemodynamic stability  Description: INTERVENTIONS:- Monitor vital signs, rhythm, and trends- Monitor for bleeding, hypotension and signs of decreased cardiac output- Evaluate effectiveness of vasoactive medications to optimize hemodynamic stability- Monitor arterial and/or venous puncture sites for bleeding and/or hematoma- Assess quality of pulses, skin color and temperature- Assess for signs of decreased coronary artery perfusion - ex. Angina- Evaluate fluid balance, assess for edema, trend weights  Outcome: Progressing  Goal: Absence of cardiac arrhythmias or at baseline  Description: INTERVENTIONS:- Continuous cardiac monitoring, monitor vital signs, obtain 12 lead EKG if indicated- Evaluate effectiveness of antiarrhythmic and heart rate control medications as ordered- Initiate emergency measures for life threatening arrhythmias- Monitor electrolytes and administer replacement therapy as ordered  Outcome: Progressing      ambulatory

## 2025-04-16 ENCOUNTER — APPOINTMENT (OUTPATIENT)
Dept: OTOLARYNGOLOGY | Facility: CLINIC | Age: 50
End: 2025-04-16
Payer: MEDICAID

## 2025-04-16 VITALS
WEIGHT: 187 LBS | SYSTOLIC BLOOD PRESSURE: 137 MMHG | BODY MASS INDEX: 27.7 KG/M2 | TEMPERATURE: 97.3 F | HEART RATE: 80 BPM | HEIGHT: 69 IN | OXYGEN SATURATION: 98 % | DIASTOLIC BLOOD PRESSURE: 81 MMHG

## 2025-04-16 DIAGNOSIS — J34.2 DEVIATED NASAL SEPTUM: ICD-10-CM

## 2025-04-16 DIAGNOSIS — J32.3 CHRONIC SPHENOIDAL SINUSITIS: ICD-10-CM

## 2025-04-16 DIAGNOSIS — J32.2 CHRONIC ETHMOIDAL SINUSITIS: ICD-10-CM

## 2025-04-16 DIAGNOSIS — J32.1 CHRONIC FRONTAL SINUSITIS: ICD-10-CM

## 2025-04-16 DIAGNOSIS — J32.0 CHRONIC MAXILLARY SINUSITIS: ICD-10-CM

## 2025-04-16 PROCEDURE — 31231 NASAL ENDOSCOPY DX: CPT

## 2025-04-16 PROCEDURE — 99213 OFFICE O/P EST LOW 20 MIN: CPT | Mod: 25

## 2025-07-23 ENCOUNTER — APPOINTMENT (OUTPATIENT)
Dept: OTOLARYNGOLOGY | Facility: CLINIC | Age: 50
End: 2025-07-23
Payer: MEDICAID

## 2025-07-23 VITALS
TEMPERATURE: 96.9 F | WEIGHT: 187 LBS | SYSTOLIC BLOOD PRESSURE: 102 MMHG | HEIGHT: 69 IN | DIASTOLIC BLOOD PRESSURE: 70 MMHG | HEART RATE: 75 BPM | BODY MASS INDEX: 27.7 KG/M2 | OXYGEN SATURATION: 99 %

## 2025-07-23 DIAGNOSIS — J32.3 CHRONIC SPHENOIDAL SINUSITIS: ICD-10-CM

## 2025-07-23 DIAGNOSIS — J32.0 CHRONIC MAXILLARY SINUSITIS: ICD-10-CM

## 2025-07-23 DIAGNOSIS — J32.1 CHRONIC FRONTAL SINUSITIS: ICD-10-CM

## 2025-07-23 DIAGNOSIS — J34.2 DEVIATED NASAL SEPTUM: ICD-10-CM

## 2025-07-23 DIAGNOSIS — J32.2 CHRONIC ETHMOIDAL SINUSITIS: ICD-10-CM

## 2025-07-23 PROCEDURE — 99213 OFFICE O/P EST LOW 20 MIN: CPT | Mod: 25

## 2025-07-23 PROCEDURE — 31231 NASAL ENDOSCOPY DX: CPT

## 2025-09-19 ENCOUNTER — LABORATORY RESULT (OUTPATIENT)
Age: 50
End: 2025-09-19

## 2025-09-19 ENCOUNTER — APPOINTMENT (OUTPATIENT)
Dept: RHEUMATOLOGY | Facility: CLINIC | Age: 50
End: 2025-09-19
Payer: MEDICAID

## 2025-09-19 VITALS
SYSTOLIC BLOOD PRESSURE: 110 MMHG | TEMPERATURE: 97.2 F | WEIGHT: 178 LBS | OXYGEN SATURATION: 97 % | HEIGHT: 69 IN | DIASTOLIC BLOOD PRESSURE: 68 MMHG | BODY MASS INDEX: 26.36 KG/M2 | HEART RATE: 92 BPM

## 2025-09-19 DIAGNOSIS — R76.8 OTHER SPECIFIED ABNORMAL IMMUNOLOGICAL FINDINGS IN SERUM: ICD-10-CM

## 2025-09-19 DIAGNOSIS — R53.83 OTHER FATIGUE: ICD-10-CM

## 2025-09-19 DIAGNOSIS — M25.50 PAIN IN UNSPECIFIED JOINT: ICD-10-CM

## 2025-09-19 LAB — LUPUS ANTICOAGULANT CASCADE REFLEX: NORMAL

## 2025-09-19 PROCEDURE — 99205 OFFICE O/P NEW HI 60 MIN: CPT | Mod: 25

## 2025-09-19 RX ORDER — EPINEPHRINE 0.3 MG/.3ML
INJECTION INTRAMUSCULAR
Refills: 0 | Status: ACTIVE | COMMUNITY

## 2025-09-20 LAB
24R-OH-CALCIDIOL SERPL-MCNC: 60.1 PG/ML
25(OH)D3 SERPL-MCNC: 31.1 NG/ML
ALBUMIN SERPL ELPH-MCNC: 4.4 G/DL
ALP BLD-CCNC: 85 U/L
ALT SERPL-CCNC: 24 U/L
ANION GAP SERPL CALC-SCNC: 14 MMOL/L
APPEARANCE: ABNORMAL
AST SERPL-CCNC: 23 U/L
B BURGDOR AB SER-IMP: NEGATIVE
B BURGDOR IGG+IGM SER QL: 0.29 INDEX
BILIRUB SERPL-MCNC: 0.4 MG/DL
BILIRUBIN URINE: ABNORMAL
BLOOD URINE: NEGATIVE
BUN SERPL-MCNC: 15 MG/DL
CALCIUM SERPL-MCNC: 10.1 MG/DL
CCP AB SER IA-ACNC: <8 U/ML
CCP AB SER QL: NEGATIVE
CENTROMERE B AB SER-ACNC: <0.2 AL
CHLORIDE SERPL-SCNC: 101 MMOL/L
CO2 SERPL-SCNC: 25 MMOL/L
COLOR: NORMAL
CREAT SERPL-MCNC: 0.84 MG/DL
CREAT SPEC-SCNC: 320 MG/DL
CREAT/PROT UR: 0.1 RATIO
CRP SERPL-MCNC: 13 MG/L
DSDNA AB SER-ACNC: <1 IU/ML
EBV EA AB SER IA-ACNC: >150 U/ML
EBV EA AB TITR SER IF: POSITIVE
EBV EA IGG SER QL IA: >600 U/ML
EBV EA IGG SER-ACNC: POSITIVE
EBV EA IGM SER IA-ACNC: NEGATIVE
EBV PATRN SPEC IB-IMP: NORMAL
EBV VCA IGG SER IA-ACNC: >750 U/ML
EBV VCA IGM SER QL IA: 12 U/ML
EGFRCR SERPLBLD CKD-EPI 2021: 86 ML/MIN/1.73M2
ENA RNP AB SER-ACNC: <0.2 AL
ENA SCL70 AB SER-ACNC: <0.2 AL
ENA SM AB SER-ACNC: <0.2 AL
ENA SS-A AB SER-ACNC: 0.3 AL
ENA SS-B AB SER-ACNC: <0.2 AL
EPSTEIN-BARR VIRUS CAPSID ANTIGEN IGG: POSITIVE
ERYTHROCYTE [SEDIMENTATION RATE] IN BLOOD BY WESTERGREN METHOD: 47 MM/HR
GLUCOSE QUALITATIVE U: NEGATIVE MG/DL
GLUCOSE SERPL-MCNC: 99 MG/DL
HAV IGM SER QL: NONREACTIVE
HBV CORE IGG+IGM SER QL: NONREACTIVE
HBV CORE IGM SER QL: NONREACTIVE
HBV SURFACE AG SER QL: NONREACTIVE
HCV AB SER QL: NONREACTIVE
HCV S/CO RATIO: 0.11 S/CO
HIV1+2 AB SPEC QL IA.RAPID: NONREACTIVE
KETONES URINE: ABNORMAL MG/DL
LEUKOCYTE ESTERASE URINE: NEGATIVE
NITRITE URINE: NEGATIVE
PH URINE: 6
POTASSIUM SERPL-SCNC: 4 MMOL/L
PROT SERPL-MCNC: 8 G/DL
PROT UR-MCNC: 20 MG/DL
PROTEIN URINE: NORMAL MG/DL
RHEUMATOID FACT SERPL-ACNC: <10 IU/ML
SODIUM SERPL-SCNC: 140 MMOL/L
SPECIFIC GRAVITY URINE: 1.03
TSH SERPL-ACNC: 0.79 UIU/ML
UROBILINOGEN URINE: 1 MG/DL

## 2025-09-21 LAB
C3 SERPL-MCNC: 172 MG/DL
C4 SERPL-MCNC: 38 MG/DL
DEPRECATED KAPPA LC FREE/LAMBDA SER: 1.87 RATIO
IGA SERPL-MCNC: 408 MG/DL
IGG SERPL-MCNC: 1514 MG/DL
IGM SERPL-MCNC: 104 MG/DL
KAPPA LC CSF-MCNC: 2.55 MG/DL
KAPPA LC SERPL-MCNC: 4.76 MG/DL

## 2025-09-22 LAB
ACE BLD-CCNC: 30 U/L
ALBUMIN MFR SERPL ELPH: 56 %
ALBUMIN SERPL-MCNC: 4.5 G/DL
ALBUMIN/GLOB SERPL: 1.3 RATIO
ALPHA1 GLOB MFR SERPL ELPH: 4.1 %
ALPHA1 GLOB SERPL ELPH-MCNC: 0.3 G/DL
ALPHA2 GLOB MFR SERPL ELPH: 9.7 %
ALPHA2 GLOB SERPL ELPH-MCNC: 0.8 G/DL
B-GLOBULIN MFR SERPL ELPH: 11.8 %
B-GLOBULIN SERPL ELPH-MCNC: 0.9 G/DL
GAMMA GLOB FLD ELPH-MCNC: 1.5 G/DL
GAMMA GLOB MFR SERPL ELPH: 18.4 %
INTERPRETATION SERPL IEP-IMP: NORMAL
M PROTEIN MFR SERPL ELPH: NORMAL
MONOCLON BAND OBS SERPL: NORMAL
PROT SERPL-MCNC: 8 G/DL
PROT SERPL-MCNC: 8 G/DL
RNA POLYMERASE III IGG: 30 UNITS

## 2025-09-23 LAB
ANCA AB SER-IMP: ABNORMAL
C-ANCA SER-ACNC: NEGATIVE
P-ANCA TITR SER IF: NEGATIVE

## 2025-09-24 LAB
ANA TITR SER: ABNORMAL
ANA TITR SER: ABNORMAL

## 2025-09-25 LAB — G6PD SER-CCNC: 17 U/G HGB
